# Patient Record
Sex: FEMALE | Race: WHITE | URBAN - METROPOLITAN AREA
[De-identification: names, ages, dates, MRNs, and addresses within clinical notes are randomized per-mention and may not be internally consistent; named-entity substitution may affect disease eponyms.]

---

## 2019-07-17 ENCOUNTER — OFFICE VISIT (OUTPATIENT)
Dept: FAMILY MEDICINE CLINIC | Age: 24
End: 2019-07-17
Payer: COMMERCIAL

## 2019-07-17 VITALS
OXYGEN SATURATION: 98 % | BODY MASS INDEX: 41.46 KG/M2 | HEART RATE: 76 BPM | HEIGHT: 66 IN | SYSTOLIC BLOOD PRESSURE: 118 MMHG | WEIGHT: 258 LBS | DIASTOLIC BLOOD PRESSURE: 82 MMHG | RESPIRATION RATE: 12 BRPM

## 2019-07-17 DIAGNOSIS — Z97.5 IUD (INTRAUTERINE DEVICE) IN PLACE: ICD-10-CM

## 2019-07-17 DIAGNOSIS — N93.9 VAGINAL BLEEDING: Primary | ICD-10-CM

## 2019-07-17 DIAGNOSIS — R85.619 ABNORMAL PAP SMEAR OF ANUS: ICD-10-CM

## 2019-07-17 DIAGNOSIS — L60.0 INGROWN TOENAIL: ICD-10-CM

## 2019-07-17 LAB
A/G RATIO: 1.8 (ref 1.1–2.2)
ALBUMIN SERPL-MCNC: 4.8 G/DL (ref 3.4–5)
ALP BLD-CCNC: 76 U/L (ref 40–129)
ALT SERPL-CCNC: 32 U/L (ref 10–40)
ANION GAP SERPL CALCULATED.3IONS-SCNC: 16 MMOL/L (ref 3–16)
AST SERPL-CCNC: 21 U/L (ref 15–37)
BILIRUB SERPL-MCNC: 0.6 MG/DL (ref 0–1)
BUN BLDV-MCNC: 12 MG/DL (ref 7–20)
CALCIUM SERPL-MCNC: 10 MG/DL (ref 8.3–10.6)
CHLORIDE BLD-SCNC: 102 MMOL/L (ref 99–110)
CHOLESTEROL, TOTAL: 211 MG/DL (ref 0–199)
CO2: 21 MMOL/L (ref 21–32)
CORTISOL TOTAL: 5.4 UG/DL
CREAT SERPL-MCNC: 0.7 MG/DL (ref 0.6–1.1)
GFR AFRICAN AMERICAN: >60
GFR NON-AFRICAN AMERICAN: >60
GLOBULIN: 2.7 G/DL
GLUCOSE BLD-MCNC: 85 MG/DL (ref 70–99)
HDLC SERPL-MCNC: 50 MG/DL (ref 40–60)
LDL CHOLESTEROL CALCULATED: 144 MG/DL
POTASSIUM SERPL-SCNC: 4.2 MMOL/L (ref 3.5–5.1)
SODIUM BLD-SCNC: 139 MMOL/L (ref 136–145)
TOTAL PROTEIN: 7.5 G/DL (ref 6.4–8.2)
TRIGL SERPL-MCNC: 84 MG/DL (ref 0–150)
VLDLC SERPL CALC-MCNC: 17 MG/DL

## 2019-07-17 PROCEDURE — G8427 DOCREV CUR MEDS BY ELIG CLIN: HCPCS | Performed by: INTERNAL MEDICINE

## 2019-07-17 PROCEDURE — G8417 CALC BMI ABV UP PARAM F/U: HCPCS | Performed by: INTERNAL MEDICINE

## 2019-07-17 PROCEDURE — 99204 OFFICE O/P NEW MOD 45 MIN: CPT | Performed by: INTERNAL MEDICINE

## 2019-07-17 PROCEDURE — 36415 COLL VENOUS BLD VENIPUNCTURE: CPT | Performed by: INTERNAL MEDICINE

## 2019-07-17 PROCEDURE — 1036F TOBACCO NON-USER: CPT | Performed by: INTERNAL MEDICINE

## 2019-07-17 RX ORDER — SULFAMETHOXAZOLE AND TRIMETHOPRIM 800; 160 MG/1; MG/1
1 TABLET ORAL 2 TIMES DAILY
Qty: 14 TABLET | Refills: 0 | Status: SHIPPED | OUTPATIENT
Start: 2019-07-17 | End: 2019-07-24

## 2019-07-17 ASSESSMENT — PATIENT HEALTH QUESTIONNAIRE - PHQ9
SUM OF ALL RESPONSES TO PHQ QUESTIONS 1-9: 0
SUM OF ALL RESPONSES TO PHQ9 QUESTIONS 1 & 2: 0
2. FEELING DOWN, DEPRESSED OR HOPELESS: 0
SUM OF ALL RESPONSES TO PHQ QUESTIONS 1-9: 0
1. LITTLE INTEREST OR PLEASURE IN DOING THINGS: 0

## 2019-07-17 NOTE — PROGRESS NOTES
Information on dietitian weight loss clinic. Counting calories. No follow-ups on file. Diagnosis and treatment discussed.   Possible side effects of medication reviewed  Patients questions answered  Follow up understood  Pt aware if they are not contacted about any test results , this does not mean they are normal.  They should call

## 2019-07-18 LAB
ESTIMATED AVERAGE GLUCOSE: 99.7 MG/DL
HBA1C MFR BLD: 5.1 %

## 2019-07-19 PROBLEM — E78.5 HYPERLIPIDEMIA: Status: ACTIVE | Noted: 2019-07-19

## 2020-12-17 ENCOUNTER — TELEPHONE (OUTPATIENT)
Dept: FAMILY MEDICINE CLINIC | Age: 25
End: 2020-12-17

## 2020-12-18 NOTE — TELEPHONE ENCOUNTER
Encouraging vv over the next few months. Does she have a form or reason this needs to be in the office?     rec vv or wait until pandemic under better control
Scheduled for VV. Patient needs for insurance.
(Service Expert - click yes below to proceed with [facility] As Usual   Scheduling)?  Yes

## 2020-12-23 ENCOUNTER — VIRTUAL VISIT (OUTPATIENT)
Dept: FAMILY MEDICINE CLINIC | Age: 25
End: 2020-12-23
Payer: COMMERCIAL

## 2020-12-23 PROBLEM — R87.619 ABNORMAL PAP SMEAR OF CERVIX: Status: ACTIVE | Noted: 2019-07-17

## 2020-12-23 PROCEDURE — 99395 PREV VISIT EST AGE 18-39: CPT | Performed by: INTERNAL MEDICINE

## 2020-12-23 ASSESSMENT — PATIENT HEALTH QUESTIONNAIRE - PHQ9
SUM OF ALL RESPONSES TO PHQ QUESTIONS 1-9: 0
2. FEELING DOWN, DEPRESSED OR HOPELESS: 0
1. LITTLE INTEREST OR PLEASURE IN DOING THINGS: 0
SUM OF ALL RESPONSES TO PHQ9 QUESTIONS 1 & 2: 0

## 2020-12-23 NOTE — PROGRESS NOTES
The below patient isbeing evaluated by a Virtual Visit (video visit) encounter to address concerns as mentioned above. A caregiver was present when appropriate. Due to this being a TeleHealth encounter (During NRE-63 public health emergency), evaluation of the following organ systems was limited: Vitals/Constitutional/EENT/Resp/CV/GI//MS/Neuro/Skin/Heme-Lymph-Imm. Pursuant to the emergency declaration under the [address] [place] [address], [address] [place] [address] authority and the [employer] Act, this Virtual Visit was conducted with patient's (and/or legal guardian's) consent, to reduce the patient's risk of exposure to COVID-19 and provide necessary medical care. The patient (and/or legal guardian) has also been advised to contact this office for worsening conditions or problems, and seek emergency medical treatment and/or call 911 if deemed necessary. Patient identification was verified at the start of the visit: Yes    Total time spent on this encounter: Not billed by time    Services were provided through a video synchronous discussion virtually to substitute for in-person clinic visit. Patient and provider were located at their individual homes. --Radha Glass MD on 12/23/2020 at 6:39 AM    An electronic signature was used to authenticate this note. History and Physical      Ruby Waterman  YOB: 1995    Date of Service:  12/23/2020    Chief Complaint:   Gonzalo Rodríguez is a 23 y.o. female who presents for complete physical examination. Chronic health issues include recurrent ingrown toenail, irregular menses, elevated BMI    BMI greater than 40. Weight up to 279. Snoring without known apnea. Denies shortness of breath GE reflux or knee pain. No history of polycystic ovarian disease. Is interested in having weight loss management. Walks for 20 minutes daily. A1c of 5.3 last year. . .  IUD intermittent spotting. Partner after marriage and divorce of fiancé last year. Positive family history of breast cancer in paternal aunt at 28 years. No known ovarian cancer. Has not had HPV vaccine. No breast lumps. Follows with gynecologist.  100% condoms. Abnormal Pap remotely with repeat normal     Right toenail infection had surgical intervention in the past however recurred. Pain swelling wishes to follow-up. No fevers or chills. Positive draining. Using no medication for this. PMH:    IUD     SH lives with cat.  and  this . No tobacco.  Workers compensation and unemployment worker. Less than a 12 pack beer weekly. No drugs. No std concerns. New partener     FH:     + breast cancer PGM, paternal aunt 27 yo, dementia,  Paternal GM brain cancer.     - colon cancer, heart disease, ovaian cancer, addiction.     Review of systems: Up-to-date on eye exams. Up-to-date dentist.  Snoring. No chronic sinus symptoms. No concussions. Denies any wheezing pneumonias abnormal chest x-rays. Has a negative TB test.  Works in the emergency room. No chest pain palpitations or syncope. Rare GE reflux. No bloody stools kidney stones recurrent bladder infections. No concerns with STD. Irregular menses.   No skin concerns      Wt Readings from Last 3 Encounters:   19 258 lb (117 kg)     BP Readings from Last 3 Encounters:   19 118/82       Patient Active Problem List   Diagnosis    Ingrown toenail    Vaginal bleeding    BMI 40.0-44.9, adult ([facility] [address]    IUD (intrauterine device) in place    Abnormal Pap smear of cervix    Hyperlipidemia       Preventive Care:  Health Maintenance   Topic Date Due    Hepatitis C screen   not needed    HPV vaccine (1 - 2-dose series) 2008    Varicella vaccine (1 of 2 - 2-dose childhood series) 2010    HIV screen  2012    Chlamydia screen  2013    DTaP/Tdap/Td vaccine (7 - Td) 2019 declines  Flu vaccine (1) 09/01/2020 will get next week    Cervical cancer screen  03/06/2022    Hepatitis B vaccine  Completed    Hib vaccine  Completed    Hepatitis A vaccine  Aged Out    Meningococcal (ACWY) vaccine  Aged Out    Pneumococcal 0-64 years Vaccine  Aged Out      Hx abnormal PAP: yes. repeat normal  Sexual activity: condom, IUD   Self-breast exams: no   Previous DEXA scan: no fractures. No FH  Last eye exam: glasses, }  Exercise: cat walk 20 min, hx gym. Seatbelt use: yes  + sunscreen if outside, smoke detector  Lipid panel:   Lab Results   Component Value Date    CHOL 211 (H) 07/17/2019    TRIG 84 07/17/2019    HDL 50 07/17/2019    LDLCALC 144 (H) 07/17/2019        Living will:      Immunization History   Administered Date(s) Administered    Hepatitis B Adult (Recombivax HB) 1995, 04/28/1998, 10/30/1998    Meningococcal MPSV4 (Menomune) 08/28/2009, 09/09/2014    Polio IPV (IPOL) 1995, 01/20/1998, 04/28/1998, 05/03/2002    Tdap (Boostrix, Adacel) 08/28/2009       No Known Allergies  Outpatient Medications Marked as Taking for the 12/23/20 encounter (Virtual Visit) with Mavis Londono MD   Medication Sig Dispense Refill    Levonorgestrel (AJ IU) by Intrauterine route         Past Medical History:   Diagnosis Date    Abnormal Pap smear of cervix 7/17/2019     No past surgical history on file. No family history on file. Assessment/Plan:   Diagnosis Orders   1. BMI 40.0-44.9, adult ([address]  [facility]   2. Mixed hyperlipidemia     3. IUD (intrauterine device) in place       Elevated BMI with probable polycystic ovarian disease. Hyperlipidemia. Referral given as well as information for free dietitian. Hyperlipidemia. Low-cholesterol diet. She has discontinued meat. Form completion.   She will return for fasting lipid and form completion flu vaccine at that time

## 2020-12-29 ENCOUNTER — NURSE ONLY (OUTPATIENT)
Dept: FAMILY MEDICINE CLINIC | Age: 25
End: 2020-12-29
Payer: COMMERCIAL

## 2020-12-29 DIAGNOSIS — Z00.01 ENCOUNTER FOR GENERAL ADULT MEDICAL EXAMINATION WITH ABNORMAL FINDINGS: ICD-10-CM

## 2020-12-29 PROCEDURE — 36415 COLL VENOUS BLD VENIPUNCTURE: CPT | Performed by: INTERNAL MEDICINE

## 2020-12-30 LAB
CHOLESTEROL, FASTING: 193 MG/DL (ref 0–199)
GLUCOSE BLD-MCNC: 97 MG/DL (ref 70–99)
HDLC SERPL-MCNC: 47 MG/DL (ref 40–60)
LDL CHOLESTEROL CALCULATED: 131 MG/DL
TRIGLYCERIDE, FASTING: 76 MG/DL (ref 0–150)
VLDLC SERPL CALC-MCNC: 15 MG/DL

## 2021-01-06 ENCOUNTER — TELEPHONE (OUTPATIENT)
Dept: FAMILY MEDICINE CLINIC | Age: 26
End: 2021-01-06

## 2021-01-06 NOTE — TELEPHONE ENCOUNTER
Returning call. Notified her of message below. She is now signed up for my-chart.  Wanting to obtain information from there